# Patient Record
Sex: FEMALE | Race: WHITE | NOT HISPANIC OR LATINO | URBAN - METROPOLITAN AREA
[De-identification: names, ages, dates, MRNs, and addresses within clinical notes are randomized per-mention and may not be internally consistent; named-entity substitution may affect disease eponyms.]

---

## 2017-05-01 ENCOUNTER — IMPORTED ENCOUNTER (OUTPATIENT)
Dept: URBAN - METROPOLITAN AREA CLINIC 1 | Facility: CLINIC | Age: 57
End: 2017-05-01

## 2017-05-01 PROBLEM — H04.123: Noted: 2017-05-01

## 2017-05-01 PROBLEM — H25.813: Noted: 2017-05-01

## 2017-05-01 PROBLEM — Z79.4: Noted: 2017-05-01

## 2017-05-01 PROBLEM — Z79.84: Noted: 2017-05-01

## 2017-05-01 PROBLEM — H40.013: Noted: 2017-05-01

## 2017-05-01 PROBLEM — E11.3393: Noted: 2017-05-01

## 2017-05-01 PROBLEM — H16.143: Noted: 2017-05-01

## 2017-05-01 PROCEDURE — 92014 COMPRE OPH EXAM EST PT 1/>: CPT

## 2017-05-01 PROCEDURE — 92133 CPTRZD OPH DX IMG PST SGM ON: CPT

## 2017-05-01 NOTE — PATIENT DISCUSSION
1.  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 H/o Focal OU. Discussed the pathophysiology of diabetes and its effect on the eye and risk of blindness. Stressed the importance of strong glucose control. Advised of importance of at least yearly dilated examinations but to contact us immediately for any problems or concerns. 2. Type II Insulin dependent diabetes mellitus also treated w/ oral medication. 3.  Glaucoma Suspect OU (0.75/0.8/ (-) FHX): Stable IOP and C/D OU. OCT remains normal OU. Patient is considered Low Risk. Condition was discussed with patient and patient understands. Will continue to monitor patient for any progression in condition. Patient was advised to call us with any problems questions or concerns. 4.  Cataract OU: Observe for now without intervention. The patient was advised to contact us if any change or worsening of vision5. LEIA w/ PEK OU- Stable. The continuation of artificial tears were recommended. 6.  Return for an appointment for a dfe in november with Dr. Molly Agrawal.

## 2017-11-06 ENCOUNTER — IMPORTED ENCOUNTER (OUTPATIENT)
Dept: URBAN - METROPOLITAN AREA CLINIC 1 | Facility: CLINIC | Age: 57
End: 2017-11-06

## 2017-11-06 PROBLEM — Z79.4: Noted: 2017-11-06

## 2017-11-06 PROBLEM — H25.813: Noted: 2017-11-06

## 2017-11-06 PROBLEM — H40.033: Noted: 2017-11-06

## 2017-11-06 PROBLEM — H40.013: Noted: 2017-11-06

## 2017-11-06 PROBLEM — E11.3393: Noted: 2017-11-06

## 2017-11-06 PROCEDURE — 92012 INTRM OPH EXAM EST PATIENT: CPT

## 2017-11-06 NOTE — PATIENT DISCUSSION
1.  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 H/o Focal OU. Discussed the pathophysiology of diabetes and its effect on the eye and risk of blindness. Stressed the importance of strong glucose control. Advised of importance of at least yearly dilated examinations but to contact us immediately for any problems or concerns. 2. Type II Insulin dependent diabetes mellitus3. Cataract OU: Observe for now without intervention. The patient was advised to contact us if any change or worsening of vision4. Glaucoma Suspect OU (0.75/0.8): Stable IOP and C/D OU. Past w/u (-). Patient is considered Low Risk. Condition was discussed with patient and patient understands. Will continue to monitor patient for any progression in condition. Patient was advised to call us with any problems questions or concerns. 5.  Narrow Angles OU- not occludible. S/p PI's OU. 6.  Return for an appointment for a 30/OCT/glare in 6 months with Dr. Nadiya Suarez.

## 2018-05-10 ENCOUNTER — IMPORTED ENCOUNTER (OUTPATIENT)
Dept: URBAN - METROPOLITAN AREA CLINIC 1 | Facility: CLINIC | Age: 58
End: 2018-05-10

## 2018-05-10 PROBLEM — H25.813: Noted: 2018-05-10

## 2018-05-10 PROBLEM — Z79.4: Noted: 2018-05-10

## 2018-05-10 PROBLEM — H40.033: Noted: 2018-05-10

## 2018-05-10 PROBLEM — H40.013: Noted: 2018-05-10

## 2018-05-10 PROBLEM — E11.3393: Noted: 2018-05-10

## 2018-05-10 PROCEDURE — 92133 CPTRZD OPH DX IMG PST SGM ON: CPT

## 2018-05-10 PROCEDURE — 92014 COMPRE OPH EXAM EST PT 1/>: CPT

## 2018-05-10 NOTE — PATIENT DISCUSSION
1.  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 H/o Focal OU. Discussed the pathophysiology of diabetes and its effect on the eye and risk of blindness. Stressed the importance of strong glucose control. Advised of importance of at least yearly dilated examinations but to contact us immediately for any problems or concerns. 2. Type II Insulin dependent diabetes mellitus3. Glaucoma Suspect OU (0.75/0.8): Stable IOP OU. Normal OCT OU. Patient is considered Low Risk. Condition was discussed with patient and patient understands. Will continue to monitor patient for any progression in condition. Patient was advised to call us with any problems questions or concerns. 4. Cataract OU: Observe for now without intervention. The patient was advised to contact us if any change or worsening of vision5. Narrow Angles OU- s/p PIs OU (Naheed) 6. Return for an appointment for a dfe/glare in 6 months with Dr. David Castro.

## 2019-03-22 ENCOUNTER — IMPORTED ENCOUNTER (OUTPATIENT)
Dept: URBAN - METROPOLITAN AREA CLINIC 1 | Facility: CLINIC | Age: 59
End: 2019-03-22

## 2019-03-22 PROBLEM — H40.033: Noted: 2019-03-22

## 2019-03-22 PROBLEM — E11.3393: Noted: 2019-03-22

## 2019-03-22 PROBLEM — Z79.84: Noted: 2019-03-22

## 2019-03-22 PROBLEM — H40.013: Noted: 2019-03-22

## 2019-03-22 PROBLEM — H25.813: Noted: 2019-03-22

## 2019-03-22 PROCEDURE — 92015 DETERMINE REFRACTIVE STATE: CPT

## 2019-03-22 PROCEDURE — 92012 INTRM OPH EXAM EST PATIENT: CPT

## 2019-03-22 NOTE — PATIENT DISCUSSION
1.  DM Type II (Oral Medication) with Moderate Nonproliferative Diabetic Retinopathy OU No Macular Edema:  Discussed the pathophysiology of diabetes and its effect on the eye and risk of blindness. Stressed the importance of strong glucose control. Advised of importance of at least yearly dilated examinations but to contact us immediately for any problems or concerns. 2. Cataract OU:  Visually Significant secondary to glare discussed the risks benefits alternatives and limitations of cataract surgery. The patient stated a full understanding and a desire to proceed with the procedure. The patient will need to return for preop appointment with cataract measurements and to have any additional questions answered and start pre-operative eye drops as directed. Phaco PCL OS then OD Otherwise follow-up 4 months 30/OCT 3.  Glaucoma Suspect OU (CD 0.75/0.80): Past w/u negative. Patient is considered Low Risk. Condition was discussed with patient and patient understands. Will continue to monitor patient for any progression in condition. Patient was advised to call us with any problems questions or concerns. 4.  Narrow Angles OU - H/o PI OUReturn for an appointment for Ascan/H and P. with Dr. Mimi Gunter.

## 2019-04-23 ENCOUNTER — IMPORTED ENCOUNTER (OUTPATIENT)
Dept: URBAN - METROPOLITAN AREA CLINIC 1 | Facility: CLINIC | Age: 59
End: 2019-04-23

## 2019-04-23 PROBLEM — H25.812: Noted: 2019-04-23

## 2019-04-23 PROCEDURE — 92136 OPHTHALMIC BIOMETRY: CPT

## 2019-04-23 NOTE — PATIENT DISCUSSION
1. Cataract OS:  Visually Significant secondary to glare discussed the risks benefits alternatives and limitations of cataract surgery. The patient stated a full understanding and a desire to proceed with the procedure. Phaco PCL OS (Standard lens standard procedure) Return for an appointment for Return as scheduled with Dr. Ernesto Lopez.

## 2019-05-01 ENCOUNTER — IMPORTED ENCOUNTER (OUTPATIENT)
Dept: URBAN - METROPOLITAN AREA CLINIC 1 | Facility: CLINIC | Age: 59
End: 2019-05-01

## 2019-05-02 ENCOUNTER — IMPORTED ENCOUNTER (OUTPATIENT)
Dept: URBAN - METROPOLITAN AREA CLINIC 1 | Facility: CLINIC | Age: 59
End: 2019-05-02

## 2019-05-02 PROBLEM — Z96.1: Noted: 2019-05-02

## 2019-05-02 PROCEDURE — 99024 POSTOP FOLLOW-UP VISIT: CPT

## 2019-05-02 NOTE — PATIENT DISCUSSION
POD#1 CE/IOL OS (Standard) doing well. Continue all 3 gtts as prescribed and until gone. Use Inveltys BID OS Ilevro Qdaily OS Ocuflox TID OS 1 gtt Combigan applied OS prior to leaving.  Post op Warnings Reiterated RTC as scheduled

## 2019-05-09 ENCOUNTER — IMPORTED ENCOUNTER (OUTPATIENT)
Dept: URBAN - METROPOLITAN AREA CLINIC 1 | Facility: CLINIC | Age: 59
End: 2019-05-09

## 2019-05-09 PROBLEM — H25.811: Noted: 2019-05-09

## 2019-05-09 PROCEDURE — 92136 OPHTHALMIC BIOMETRY: CPT

## 2019-05-09 NOTE — PATIENT DISCUSSION
1.  Cataract OD: Visually Significant secondary to glare discussed the risks benefits alternatives and limitations of cataract surgery. The patient stated a full understanding and a desire to proceed with the procedure. Pt understands they will need glasses post-op to achieve their best corrected vision. Phaco PCL OD (Standard lens standard procedure) 2. POW#2  CE/IOL OS doing well.   Use Inveltys BID OS Ilevro Qdaily OSF/u as scheduled 2nd eye

## 2019-05-15 ENCOUNTER — IMPORTED ENCOUNTER (OUTPATIENT)
Dept: URBAN - METROPOLITAN AREA CLINIC 1 | Facility: CLINIC | Age: 59
End: 2019-05-15

## 2019-05-16 ENCOUNTER — IMPORTED ENCOUNTER (OUTPATIENT)
Dept: URBAN - METROPOLITAN AREA CLINIC 1 | Facility: CLINIC | Age: 59
End: 2019-05-16

## 2019-05-16 PROBLEM — Z96.1: Noted: 2019-05-16

## 2019-05-16 PROCEDURE — 99024 POSTOP FOLLOW-UP VISIT: CPT

## 2019-05-16 NOTE — PATIENT DISCUSSION
1. POD#1 Phaco/ PCL OD (Standard)- doing well. Continue all 3 gtts as prescribed and until gone. Use Inveltys BID OD Ilevro Qdaily OD Ocuflox TID OD Post op Warnings Reiterated 2. POW#2 Phaco/ PCL OS (Standard) - doing well Continue all 3 gtts as prescribed and until gone. Use Inveltys BID OS till out Use Ilevro Qdaily OS till out Post op Warnings Reiterated RTC as scheduled

## 2019-06-06 ENCOUNTER — IMPORTED ENCOUNTER (OUTPATIENT)
Dept: URBAN - METROPOLITAN AREA CLINIC 1 | Facility: CLINIC | Age: 59
End: 2019-06-06

## 2019-06-06 PROBLEM — Z09: Noted: 2019-06-06

## 2019-06-06 PROCEDURE — 99024 POSTOP FOLLOW-UP VISIT: CPT

## 2019-06-06 NOTE — PATIENT DISCUSSION
POW#3 Phaco/ PCL OU (Standard OU) doing well Finish PO meds per schedule MRX for glasses given Return for an appointment in 3-4 mo 30 with Dr. Kalpana Dallas.

## 2019-07-15 ENCOUNTER — IMPORTED ENCOUNTER (OUTPATIENT)
Dept: URBAN - METROPOLITAN AREA CLINIC 1 | Facility: CLINIC | Age: 59
End: 2019-07-15

## 2019-07-15 PROBLEM — H43.11: Noted: 2019-07-15

## 2019-07-15 PROCEDURE — 92012 INTRM OPH EXAM EST PATIENT: CPT

## 2019-07-15 NOTE — PATIENT DISCUSSION
1.  Vitreous Hemorrhage OD (possible tear vs DM related) -- View / VA obscured by heme. Refer to Retina tomorrow for second opinion & evaluation to r/o possible tear. 2.  DM Type II (Oral Medication) with h/o Moderate Nonproliferative Diabetic Retinopathy OU but no Macular Edema OU:  3.  Pseudophakia (Standard OU) -- Doing well4. Glaucoma Suspect OU (CD 0.75/0.80): Past w/u negative. Patient is considered Low Risk. Condition was discussed with patient and patient understands. Will continue to monitor patient for any progression in condition. Patient was advised to call us with any problems questions or concerns. 5.  Narrow Angles OU - H/o PI OU ([place])Return as scheduled in October 2019 for 30 OU with Dr. Cyrus Mcgarry.

## 2019-10-15 ENCOUNTER — IMPORTED ENCOUNTER (OUTPATIENT)
Dept: URBAN - METROPOLITAN AREA CLINIC 1 | Facility: CLINIC | Age: 59
End: 2019-10-15

## 2019-10-15 PROBLEM — Z79.84: Noted: 2019-10-15

## 2019-10-15 PROBLEM — H43.11: Noted: 2019-10-15

## 2019-10-15 PROBLEM — Z96.1: Noted: 2019-10-15

## 2019-10-15 PROBLEM — H40.013: Noted: 2019-10-15

## 2019-10-15 PROBLEM — E11.3293: Noted: 2019-10-15

## 2019-10-15 PROCEDURE — 92014 COMPRE OPH EXAM EST PT 1/>: CPT

## 2019-10-15 NOTE — PATIENT DISCUSSION
1.  DM Type II with Proliferative Diabetic Retinopathy with no Macular Edema OU: w/ Vitreous Hemorrhage OD improved. S/p Focal OU. S/p PRP OU (Dr. Xavi Muñoz)  Discussed the pathophysiology of diabetes and its effect on the eye and risk of blindness. Stressed the importance of strong glucose control. Advised of importance of at least yearly dilated examinations but to contact us immediately for any problems or concerns. 2. Pseudophakia OU - (Standard OU) 3. Glaucoma Suspect OU (CD 0.75/0.80): Past w/u negative. IOP stable. Patient is considered Low Risk. Condition was discussed with patient and patient understands. Will continue to monitor patient for any progression in condition. Patient was advised to call us with any problems questions or concerns. Patient deferred Manifest Rx today. Return for an appointment in 6 months 10/DFE with Dr. Rakesh Castillo.

## 2020-04-28 ENCOUNTER — IMPORTED ENCOUNTER (OUTPATIENT)
Dept: URBAN - METROPOLITAN AREA CLINIC 1 | Facility: CLINIC | Age: 60
End: 2020-04-28

## 2020-04-28 PROBLEM — H26.493: Noted: 2020-04-28

## 2020-04-28 PROBLEM — E11.3293: Noted: 2020-04-28

## 2020-04-28 PROBLEM — Z96.1: Noted: 2020-04-28

## 2020-04-28 PROCEDURE — 92012 INTRM OPH EXAM EST PATIENT: CPT

## 2020-04-28 NOTE — PATIENT DISCUSSION
1.  DM Type II with Proliferative Diabetic Retinopathy with no Macular Edema OU -- Perisistent vit heme/active PDR inf OD. Follow up with Dr. Xavi Muñoz. S/p Focal OU. S/p PRP OU. Discussed the pathophysiology of diabetes and its effect on the eye and risk of blindness. Stressed the importance of strong glucose control. Advised of importance of at least yearly dilated examinations but to contact us immediately for any problems or concerns. 2. PCO OU -- Visually Significant *secondary to glare and hazy vision*; schedule YAG Cap OD then OS. Pros cons risks and benefits. 3.  Pseudophakia OU -- Standard OU 4. H/o COAG SuspectReturn for an appointment in YAG CAP OD next Friday with Dr. Rakesh Castillo. Will schedule YAG CAP OS at next appt. Refer patient to Dr. Darell Dubin.
PCO OU- Visually Significant *secondary to glare*; schedule YAG Cap. Pros cons risks and benefits.
Pseudophakia OU -
no

## 2020-05-08 ENCOUNTER — IMPORTED ENCOUNTER (OUTPATIENT)
Dept: URBAN - METROPOLITAN AREA CLINIC 1 | Facility: CLINIC | Age: 60
End: 2020-05-08

## 2020-05-08 PROBLEM — H26.491: Noted: 2020-05-08

## 2020-05-08 PROCEDURE — 66821 AFTER CATARACT LASER SURGERY: CPT

## 2020-05-08 NOTE — PATIENT DISCUSSION
YAG CAP OD: (Consent signed and scanned into attachments) 1 gtt Prolensa applied. The purpose and nature of the procedure possible alternative methods of treatment the risks involved and the possibility of complications were discussed with patient. The Patient wishes to proceed and the consent was signed. The laser was then performed under topical anesthesia with no complications. Post op instructions were given to patient as well as a follow-up appointment. Patient was advised to call our office if any questions or concerns. Return for an appointment in YAG Cap OS with Dr. Everett Lopez.

## 2020-05-15 ENCOUNTER — IMPORTED ENCOUNTER (OUTPATIENT)
Dept: URBAN - METROPOLITAN AREA CLINIC 1 | Facility: CLINIC | Age: 60
End: 2020-05-15

## 2020-05-15 PROBLEM — H26.492: Noted: 2020-05-15

## 2020-05-15 PROCEDURE — 66821 AFTER CATARACT LASER SURGERY: CPT

## 2020-05-26 ENCOUNTER — IMPORTED ENCOUNTER (OUTPATIENT)
Dept: URBAN - METROPOLITAN AREA CLINIC 1 | Facility: CLINIC | Age: 60
End: 2020-05-26

## 2020-05-26 PROCEDURE — 99024 POSTOP FOLLOW-UP VISIT: CPT

## 2020-05-26 NOTE — PATIENT DISCUSSION
PO YAG Cap OU: good result. MRX given. Return for an appointment in 6 months 30/OCT with Dr. Janey Swanson.

## 2020-11-12 ENCOUNTER — IMPORTED ENCOUNTER (OUTPATIENT)
Dept: URBAN - METROPOLITAN AREA CLINIC 1 | Facility: CLINIC | Age: 60
End: 2020-11-12

## 2020-11-12 PROBLEM — Z79.4: Noted: 2020-11-12

## 2020-11-12 PROBLEM — H40.023: Noted: 2020-11-12

## 2020-11-12 PROBLEM — Z96.1: Noted: 2020-11-12

## 2020-11-12 PROBLEM — E11.3293: Noted: 2020-11-12

## 2020-11-12 PROCEDURE — 92014 COMPRE OPH EXAM EST PT 1/>: CPT

## 2020-11-12 PROCEDURE — 92133 CPTRZD OPH DX IMG PST SGM ON: CPT

## 2020-11-12 NOTE — PATIENT DISCUSSION
1.   DM Type II (Insulin) with Proliferative Diabetic Retinopathy with no Macular Edema OU -- Perisistent vit heme/active PDR inf OD. Follow up with Dr. Edilberto Nava. Keep appointments w/ Dr. Edilberto Nava. S/p Focal OU. S/p PRP OU. Discussed the pathophysiology of diabetes and its effect on the eye and risk of blindness. Stressed the importance of strong glucose control. Advised of importance of at least yearly dilated examinations but to contact us immediately for any problems or concerns. 2. Glaucoma Suspect OU (CD 0.75/0.80): OCT shows slight progression OD WNL OS. IOP sable. Patient is considered high risk. Condition was discussed with patient and patient understands. Will continue to monitor patient for any progression in condition. Patient was advised to call us with any problems questions or concerns. 3.  Pseudophakia OU - (Standard OU) H/o Yag Cap OU4. PVD OU - RD precautions. Patient deferred Manifest Rx today. Return for an appointment in 6 months 10/HVF with Dr. Wero Lemus.

## 2021-05-17 ENCOUNTER — IMPORTED ENCOUNTER (OUTPATIENT)
Dept: URBAN - METROPOLITAN AREA CLINIC 1 | Facility: CLINIC | Age: 61
End: 2021-05-17

## 2021-05-17 PROBLEM — H40.023: Noted: 2021-05-17

## 2021-05-17 PROCEDURE — 92012 INTRM OPH EXAM EST PATIENT: CPT

## 2021-05-17 PROCEDURE — 92083 EXTENDED VISUAL FIELD XM: CPT

## 2021-05-17 NOTE — PATIENT DISCUSSION
1.  Glaucoma Suspect OU (CD 0.75/0.80) -- HVF today showing Superior arc and nasal step OU however may be component of artifact from PRP laser. IOP stable. Will cont to observe patient as high risk. 2. H/o DM Type II (Insulin) with Proliferative Diabetic Retinopathy with no Macular Edema OU -- Perisistent vit heme/active PDR inf OD. Follow up with Dr. Thiago Leonardo. Keep appointments w/ Dr. Thiaog Leonardo. S/p Focal OU. S/p PRP OU. 3.  Pseudophakia OU - (Standard OU) H/o Yag Cap OU4. PVD OU - RD precautions. Return for an appointment in 6 months 30/OCT with Dr. Philomena Alonso.

## 2021-11-15 ENCOUNTER — IMPORTED ENCOUNTER (OUTPATIENT)
Dept: URBAN - METROPOLITAN AREA CLINIC 1 | Facility: CLINIC | Age: 61
End: 2021-11-15

## 2021-11-15 PROBLEM — Z96.1: Noted: 2021-11-15

## 2021-11-15 PROBLEM — Z79.4: Noted: 2021-11-15

## 2021-11-15 PROBLEM — H40.023: Noted: 2021-11-15

## 2021-11-15 PROBLEM — E11.3293: Noted: 2021-11-15

## 2021-11-15 PROBLEM — H40.021: Noted: 2021-11-15

## 2021-11-15 PROCEDURE — 92014 COMPRE OPH EXAM EST PT 1/>: CPT

## 2021-11-15 PROCEDURE — 92015 DETERMINE REFRACTIVE STATE: CPT

## 2021-11-15 PROCEDURE — 92133 CPTRZD OPH DX IMG PST SGM ON: CPT

## 2021-11-15 NOTE — PATIENT DISCUSSION
1.   DM Type II (Insulin) with Proliferative Diabetic Retinopathy with no Macular Edema OU - Perisistent vit heme/active PDR inf OD. Follow up with Dr. Linda Kincaid. Keep appointments w/ Dr. Linda Kincaid. S/p Focal OU. S/p PRP OU. Discussed the pathophysiology of diabetes and its effect on the eye and risk of blindness. Stressed the importance of strong glucose control. Advised of importance of at least yearly dilated examinations but to contact us immediately for any problems or concerns. 2. Glaucoma Suspect OU (CD 0.75/0.80) - IOP stable. OCT shows no progression OU. Patient is considered high risk. Condition was discussed with patient and patient understands. Will continue to monitor patient for any progression in condition. Patient was advised to call us with any problems questions or concerns. 3.  Pseudophakia OU - (Standard OU) H/o Yag Cap OU4. PVD OU - old/stable. MRX given. Letter to PCP. Return for an appointment in 6 months 10/HVF with Dr. Ernesto Lopez.

## 2022-04-02 ASSESSMENT — VISUAL ACUITY
OD_CC: 20/25
OD_CC: J3
OS_CC: 20/40-1
OS_SC: 20/20-1
OD_CC: 20/20
OS_CC: 20/20
OS_CC: J2
OD_SC: 20/40-2
OS_CC: 20/20
OD_CC: 20/25
OD_CC: 20/20
OS_GLARE: 20/80
OD_CC: J3
OS_CC: J3
OD_SC: 20/20-1
OS_GLARE: 20/400
OD_CC: 20/20-1
OS_CC: 20/30
OD_CC: 20/30-2
OS_CC: 20/40
OS_CC: 20/20
OS_CC: 20/30-1
OS_CC: 20/30
OD_CC: 20/25
OD_SC: 20/20
OS_CC: 20/30
OD_GLARE: 20/80
OD_CC: 20/40-2
OS_CC: 20/25
OD_CC: 20/40
OD_CC: 20/30
OS_CC: 20/20
OD_GLARE: 20/400
OS_CC: 20/25
OS_SC: 20/20
OS_CC: 20/25-2
OD_CC: 20/25
OS_SC: 20/30+2

## 2022-04-02 ASSESSMENT — TONOMETRY
OD_IOP_MMHG: 17
OD_IOP_MMHG: 18
OS_IOP_MMHG: 27
OS_IOP_MMHG: 17
OS_IOP_MMHG: 17
OD_IOP_MMHG: 16
OD_IOP_MMHG: 18
OD_IOP_MMHG: 17
OS_IOP_MMHG: 17
OD_IOP_MMHG: 17
OS_IOP_MMHG: 17
OD_IOP_MMHG: 18
OS_IOP_MMHG: 16
OS_IOP_MMHG: 22
OD_IOP_MMHG: 17
OD_IOP_MMHG: 17
OS_IOP_MMHG: 17
OD_IOP_MMHG: 18
OD_IOP_MMHG: 17
OS_IOP_MMHG: 16
OS_IOP_MMHG: 17
OD_IOP_MMHG: 17
OS_IOP_MMHG: 27
OD_IOP_MMHG: 17
OS_IOP_MMHG: 18

## 2022-04-02 ASSESSMENT — KERATOMETRY
OD_K1POWER_DIOPTERS: 41.75
OD_AXISANGLE_DEGREES: 012
OD_AXISANGLE2_DEGREES: 105
OD_K2POWER_DIOPTERS: 43.25
OS_K2POWER_DIOPTERS: 43.50
OS_K1POWER_DIOPTERS: 42.00
OD_K1POWER_DIOPTERS: 42.00
OD_K2POWER_DIOPTERS: 42.50
OD_AXISANGLE_DEGREES: 015
OS_AXISANGLE2_DEGREES: 069
OS_K2POWER_DIOPTERS: 43.75
OS_AXISANGLE_DEGREES: 166
OD_AXISANGLE2_DEGREES: 102
OS_K1POWER_DIOPTERS: 42.25
OS_AXISANGLE_DEGREES: 159
OS_AXISANGLE2_DEGREES: 076

## 2022-05-17 ENCOUNTER — FOLLOW UP (OUTPATIENT)
Dept: URBAN - METROPOLITAN AREA CLINIC 1 | Facility: CLINIC | Age: 62
End: 2022-05-17

## 2022-05-17 DIAGNOSIS — H40.023: ICD-10-CM

## 2022-05-17 PROCEDURE — 92012 INTRM OPH EXAM EST PATIENT: CPT

## 2022-05-17 PROCEDURE — 92083 EXTENDED VISUAL FIELD XM: CPT

## 2022-05-17 ASSESSMENT — TONOMETRY
OD_IOP_MMHG: 17
OS_IOP_MMHG: 17

## 2022-05-17 ASSESSMENT — VISUAL ACUITY
OS_SC: 20/25-2
OD_SC: 20/30

## 2022-05-17 NOTE — PATIENT DISCUSSION
(CD 0.75/0.80) HVF defects OS>OD. IOP stable, continue to observe off gtts. HVF defect OS likely from additional PRP performed at last visit with Lacey Nix. *If OCT WNL at next visit, will follow annually. Patient is considered high risk. Condition was discussed with patient and patient understands. Will continue to monitor patient for any progression in condition. Patient was advised to call us with any problems, questions, or concerns.

## 2022-11-15 ENCOUNTER — COMPREHENSIVE EXAM (OUTPATIENT)
Dept: URBAN - METROPOLITAN AREA CLINIC 1 | Facility: CLINIC | Age: 62
End: 2022-11-15

## 2022-11-15 DIAGNOSIS — Z96.1: ICD-10-CM

## 2022-11-15 DIAGNOSIS — E11.3593: ICD-10-CM

## 2022-11-15 DIAGNOSIS — H40.023: ICD-10-CM

## 2022-11-15 DIAGNOSIS — Z79.4: ICD-10-CM

## 2022-11-15 PROCEDURE — 92133 CPTRZD OPH DX IMG PST SGM ON: CPT

## 2022-11-15 PROCEDURE — 92014 COMPRE OPH EXAM EST PT 1/>: CPT

## 2022-11-15 ASSESSMENT — TONOMETRY
OD_IOP_MMHG: 19
OS_IOP_MMHG: 19

## 2022-11-15 ASSESSMENT — VISUAL ACUITY
OD_SC: 20/60
OS_SC: 20/20

## 2022-11-15 NOTE — PATIENT DISCUSSION
(CD 0.75/0.80) OCT shows RNFL thinning OD, however, likely artifact to vitreous hemorrhage, WNL OS. IOP stable, continue to observe off gtts. Patient is considered high risk. Condition was discussed with patient and patient understands. Will continue to monitor patient for any progression in condition. Patient was advised to call us with any problems, questions, or concerns.

## 2025-08-12 ENCOUNTER — COMPREHENSIVE EXAM (OUTPATIENT)
Age: 65
End: 2025-08-12

## 2025-08-12 DIAGNOSIS — H40.1123: ICD-10-CM

## 2025-08-12 DIAGNOSIS — Z98.890: ICD-10-CM

## 2025-08-12 DIAGNOSIS — H40.021: ICD-10-CM

## 2025-08-12 DIAGNOSIS — Z96.1: ICD-10-CM

## 2025-08-12 DIAGNOSIS — Z79.4: ICD-10-CM

## 2025-08-12 DIAGNOSIS — E11.3593: ICD-10-CM

## 2025-08-12 PROCEDURE — 99214 OFFICE O/P EST MOD 30 MIN: CPT

## 2025-08-12 PROCEDURE — 92133 CPTRZD OPH DX IMG PST SGM ON: CPT
